# Patient Record
Sex: MALE | Race: ASIAN
[De-identification: names, ages, dates, MRNs, and addresses within clinical notes are randomized per-mention and may not be internally consistent; named-entity substitution may affect disease eponyms.]

---

## 2019-07-20 ENCOUNTER — HOSPITAL ENCOUNTER (EMERGENCY)
Dept: HOSPITAL 25 - UCCORT | Age: 19
Discharge: HOME | End: 2019-07-20
Payer: SELF-PAY

## 2019-07-20 VITALS — DIASTOLIC BLOOD PRESSURE: 71 MMHG | SYSTOLIC BLOOD PRESSURE: 117 MMHG
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
## 2019-07-20 DIAGNOSIS — S00.83XA: ICD-10-CM

## 2019-07-20 DIAGNOSIS — S43.401A: Primary | ICD-10-CM
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
## 2019-07-20 PROCEDURE — 70450 CT HEAD/BRAIN W/O DYE: CPT

## 2019-07-20 PROCEDURE — 99203 OFFICE O/P NEW LOW 30 MIN: CPT

## 2019-07-20 PROCEDURE — 70486 CT MAXILLOFACIAL W/O DYE: CPT

## 2019-07-20 PROCEDURE — G0463 HOSPITAL OUTPT CLINIC VISIT: HCPCS
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
